# Patient Record
Sex: MALE | Race: WHITE | HISPANIC OR LATINO | Employment: UNEMPLOYED | URBAN - METROPOLITAN AREA
[De-identification: names, ages, dates, MRNs, and addresses within clinical notes are randomized per-mention and may not be internally consistent; named-entity substitution may affect disease eponyms.]

---

## 2023-06-21 PROBLEM — M54.16 LUMBAR RADICULOPATHY: Status: ACTIVE | Noted: 2023-06-21

## 2023-06-21 PROBLEM — M51.26 HNP (HERNIATED NUCLEUS PULPOSUS), LUMBAR: Status: ACTIVE | Noted: 2023-06-21

## 2023-06-22 PROBLEM — R73.9 HYPERGLYCEMIA: Status: ACTIVE | Noted: 2023-06-22

## 2023-06-22 PROBLEM — D72.829 LEUKOCYTOSIS: Status: ACTIVE | Noted: 2023-06-22

## 2023-06-22 PROBLEM — M51.16 LUMBAR DISC HERNIATION WITH RADICULOPATHY: Status: ACTIVE | Noted: 2023-06-22

## 2023-06-23 PROBLEM — R73.03 PREDIABETES: Status: ACTIVE | Noted: 2023-06-23

## 2023-06-23 PROBLEM — E66.9 OBESITY (BMI 30-39.9): Status: ACTIVE | Noted: 2023-06-23

## 2023-06-23 PROBLEM — R52 ACUTE PAIN: Status: ACTIVE | Noted: 2023-06-23

## 2023-07-13 PROBLEM — Z98.890 STATUS POST LUMBAR SURGERY: Status: ACTIVE | Noted: 2023-07-13

## 2023-08-09 NOTE — PROGRESS NOTES
"Subjective   History of Present Illness: Ajay Tapia is a 38 y.o. male is here today for follow-up after physical therapy. He had a L5-S1 discectomy on 06-26-23.    Today, Mr. Marvin reports right leg pain with prolonged sitting or crouching down.  In the right leg is somewhat different than the preoperative pain as its mostly just behind the right knee and into the proximal calf and he states its not as severe as the pain he had prior to surgery.  He will get a burning sensation in the bottom of the foot and does have a tingling or numb sensation on the lateral aspect of the lower right leg.  He also reports dull constant lower back pain.    History of Present Illness    Tobacco Use: Low Risk     Smoking Tobacco Use: Never    Smokeless Tobacco Use: Never    Passive Exposure: Not on file        The following portions of the patient's history were reviewed and updated as appropriate: allergies, current medications, past family history, past medical history, past social history, past surgical history and problem list.    Review of Systems    Objective     Vitals:    08/15/23 1034   BP: 120/84   Weight: 83.9 kg (185 lb)   Height: 160 cm (63\")     Body mass index is 32.77 kg/mý.      Physical Exam  Neurologic Exam    Physical Exam:    CONSTITUTIONAL:  appears well developed, well-nourished and in no acute distress.    NECK: the neck is supple and symmetric. The trachea is midline with no masses.      PULMONARY: Respiratory effort is normal with no increased work of breathing or signs of respiratory distress.    CARDIOVASCULAR: Pedal pulses are +2/4 bilaterally. Examination of the extremities shows no edema or varicosities.    MUSCULOSKELETAL: Gait normal and improved from preop where he was not ambulating well and had to ambulate with severe lumbago guarding the right leg    SKIN: The skin is warm, dry and intact.  Incision healing well    NEUROLOGIC:   Normal motor strength noted both lower extremities. Muscle " bulk and tone are normal.  Sensory exam is normal to fine touch in both lower extremities although he has a subjective sense of numbness in the S1 distribution of the left leg but testing all along the dermatome he had relatively symmetrical sensation  Reflexes 2/4 and symmetrical at the knees, 1/4 at the ankles with a slight asymmetry of the right ankle being less prominent than the left  Right leg raising test reveals a pain behind the right knee, focally in the area of the insertional lateral hamstring tendon which is definitely not a positive straight leg raising test and might reflect muscular tightening of the hamstring.  Cortical function is intact and without deficits. Speech is normal.    PSYCHIATRIC: oriented to person, place and time. Patient's mood and affect are normal.    Assessment & Plan       Medical Decision Making:      He continues to have some persistent symptomatology in the back and the right leg although he does not have constant radicular symptoms any longer and I think this is reflective of the bruise to the S1 nerve root.  He still has some residual back pain as well which is not unusual but he does not feel confident increasing his activity back to normal and therefore we will refer him to a physical medicine and rehab specialist to see if he is appropriate to consider work conditioning/hardening or other medical management versus a functional capacity examination.    After evaluation he did request going back to work light duty while he awaits an evaluation from physical medicine rehab and I think certainly he could resume work activity with the 25 pound restriction.    Return If his pain progresses despite continued treatment or surgical questions arise.    Diagnoses and all orders for this visit:    1. Status post lumbar surgery (Primary)  -     Ambulatory Referral to Physical Medicine Rehab    2. Lumbar disc herniation with radiculopathy  -     Ambulatory Referral to Physical Medicine  Rehab             Yoav Borjas MD FACS [facility]  Neurological Surgery

## 2023-08-15 ENCOUNTER — TELEPHONE (OUTPATIENT)
Dept: NEUROSURGERY | Facility: CLINIC | Age: 38
End: 2023-08-15

## 2023-08-15 ENCOUNTER — OFFICE VISIT (OUTPATIENT)
Dept: NEUROSURGERY | Facility: CLINIC | Age: 38
End: 2023-08-15

## 2023-08-15 VITALS
DIASTOLIC BLOOD PRESSURE: 84 MMHG | BODY MASS INDEX: 32.78 KG/M2 | HEIGHT: 63 IN | SYSTOLIC BLOOD PRESSURE: 120 MMHG | WEIGHT: 185 LBS

## 2023-08-15 DIAGNOSIS — M51.16 LUMBAR DISC HERNIATION WITH RADICULOPATHY: ICD-10-CM

## 2023-08-15 DIAGNOSIS — Z98.890 STATUS POST LUMBAR SURGERY: Primary | ICD-10-CM

## 2023-08-15 PROCEDURE — 99024 POSTOP FOLLOW-UP VISIT: CPT | Performed by: NEUROLOGICAL SURGERY

## 2023-08-15 NOTE — TELEPHONE ENCOUNTER
Dr. Weber's office called stated this patient has an appointment on 8/24/203  @11:00 am. Since he is self pay his first initial visit is $120.00 and then all other visits will be $100.00 Per office policy. He will also most likely receive a bill just like our office does. Please make him aware that the upfront cash is not the entire office bill. Also Dr. Weber's office is requesting us to get the  for appointment. Patient speaks [place].